# Patient Record
Sex: MALE | Race: WHITE | NOT HISPANIC OR LATINO
[De-identification: names, ages, dates, MRNs, and addresses within clinical notes are randomized per-mention and may not be internally consistent; named-entity substitution may affect disease eponyms.]

---

## 2021-12-02 ENCOUNTER — APPOINTMENT (OUTPATIENT)
Dept: UROLOGY | Facility: CLINIC | Age: 26
End: 2021-12-02
Payer: MEDICAID

## 2021-12-02 VITALS
HEART RATE: 84 BPM | OXYGEN SATURATION: 96 % | DIASTOLIC BLOOD PRESSURE: 75 MMHG | SYSTOLIC BLOOD PRESSURE: 129 MMHG | TEMPERATURE: 98.4 F | BODY MASS INDEX: 17.67 KG/M2 | WEIGHT: 90 LBS | HEIGHT: 60 IN

## 2021-12-02 DIAGNOSIS — Z80.9 FAMILY HISTORY OF MALIGNANT NEOPLASM, UNSPECIFIED: ICD-10-CM

## 2021-12-02 DIAGNOSIS — Z87.728 PERSONAL HISTORY OF OTHER SPECIFIED (CORRECTED) CONGENITAL MALFORMATIONS OF NERVOUS SYSTEM AND SENSE ORGANS: ICD-10-CM

## 2021-12-02 DIAGNOSIS — Z78.9 OTHER SPECIFIED HEALTH STATUS: ICD-10-CM

## 2021-12-02 PROCEDURE — 99204 OFFICE O/P NEW MOD 45 MIN: CPT

## 2021-12-02 RX ORDER — METHENAMINE HIPPURATE 1 G/1
1 TABLET ORAL TWICE DAILY
Qty: 60 | Refills: 6 | Status: ACTIVE | COMMUNITY
Start: 2021-12-02 | End: 1900-01-01

## 2021-12-02 RX ORDER — CRANBERRY FRUIT EXTRACT 200 MG
CAPSULE ORAL
Refills: 0 | Status: ACTIVE | COMMUNITY

## 2021-12-02 RX ORDER — SOLIFENACIN SUCCINATE 10 MG/1
TABLET ORAL
Refills: 0 | Status: ACTIVE | COMMUNITY

## 2021-12-02 NOTE — LETTER BODY
[Dear  ___] : Dear  [unfilled], [Consult Letter:] : I had the pleasure of evaluating your patient, [unfilled]. [Please see my note below.] : Please see my note below. [Consult Closing:] : Thank you very much for allowing me to participate in the care of this patient.  If you have any questions, please do not hesitate to contact me. [Sincerely,] : Sincerely, [FreeTextEntry3] : Sam Aranda MD\par System Director [facility]\par Department of Urology\par [facility] \par   at [facility]\par  of Urology\par [facility] [employer] at hospitals/[facility]\par

## 2021-12-02 NOTE — ASSESSMENT
[FreeTextEntry1] : \par Impression/plan: 26 year-old gentleman with spina bifida presents to the office with recurrent UTIs, neurogenic bladder and OAB-wet.\par \par 1. Renal US: assess for hydronephrosis. \par 2. Start Hiprex BID for 6 month. Start with Hiprex once finished with Bactrim DS. \par 3. Will f/u in 2 weeks via TTM.\par 4. Requested chart work from Dr. Gan. \par \par

## 2021-12-02 NOTE — HISTORY OF PRESENT ILLNESS
[FreeTextEntry1] : 26 year-old gentleman with spina bifida presents to the office with c/o of recurrent UTIs. Patient reports over 10 UTIs in this past calender year. Patient reports that since this past September he has been on 3-4 different medications. The past three UTIs he has been on Bactrim. Patient is currently on Bactrim DS. Patient reports his symptoms to be more oh a difficulty urinating and b/l flank pain. Patient reports that in the past patient would get  UTIs 4x a year. Patient does CIC every 4 hours and wears diapers throughout the day which he changes every time he does CIC. Patient has seen Dr. Gan ([place]) UDS in April 2021 and cystoscopy. Patient and mother reports that no stones were ever found. Last US  was in 2020. Denies fevers, chills, n/v/d, chest pain, SOB at this times\par \par Patient on Solifenacin 20 mg daily prescribed by Dr. Gan, which he has not taken since he has had this recurrent UTIs. Patient and mother report that Dr. Gan mentioned intravesical Botox but never specified when it would be done. \par \par Will call and get study reports and imaging results faxed over.

## 2021-12-02 NOTE — PHYSICAL EXAM
[General Appearance - Well Developed] : well developed [General Appearance - Well Nourished] : well nourished [Normal Appearance] : normal appearance [Well Groomed] : well groomed [General Appearance - In No Acute Distress] : no acute distress [Edema] : no peripheral edema [Respiration, Rhythm And Depth] : normal respiratory rhythm and effort [Exaggerated Use Of Accessory Muscles For Inspiration] : no accessory muscle use [] : no rash [Oriented To Time, Place, And Person] : oriented to person, place, and time [Affect] : the affect was normal [Mood] : the mood was normal [Not Anxious] : not anxious [FreeTextEntry1] : patient with spina bifida

## 2021-12-17 ENCOUNTER — APPOINTMENT (OUTPATIENT)
Dept: UROLOGY | Facility: CLINIC | Age: 26
End: 2021-12-17
Payer: MEDICAID

## 2021-12-17 DIAGNOSIS — N39.41 URGE INCONTINENCE: ICD-10-CM

## 2021-12-17 DIAGNOSIS — N39.0 URINARY TRACT INFECTION, SITE NOT SPECIFIED: ICD-10-CM

## 2021-12-17 DIAGNOSIS — N31.9 NEUROMUSCULAR DYSFUNCTION OF BLADDER, UNSPECIFIED: ICD-10-CM

## 2021-12-17 PROCEDURE — ZZZZZ: CPT

## 2021-12-17 RX ORDER — NITROFURANTOIN MACROCRYSTALS 50 MG/1
50 CAPSULE ORAL
Qty: 30 | Refills: 6 | Status: ACTIVE | COMMUNITY
Start: 2021-12-17 | End: 1900-01-01

## 2021-12-20 NOTE — ASSESSMENT
[FreeTextEntry1] : \par \par Impression/plan: 26 year-old gentleman with spina bifida with recurrent UTIs, neurogenic bladder and OAB-wet.\par \par 1. We discussed option of Botox injections, he will think about it while continuing on anticholinergic. \par 2. Change UTI prophylaxis to daily SHELIA, se discussed. \par 3. F/u in 3 months. \par

## 2021-12-20 NOTE — LETTER BODY
[Dear  ___] : Dear  [unfilled], [Courtesy Letter:] : I had the pleasure of seeing your patient, [unfilled], in my office today. [Please see my note below.] : Please see my note below. [Consult Closing:] : Thank you very much for allowing me to participate in the care of this patient.  If you have any questions, please do not hesitate to contact me. [Sincerely,] : Sincerely, [FreeTextEntry3] : Sam Aranda MD\par System Director [facility]\par Department of Urology\par [facility] \par   at [facility]\par  of Urology\par [facility] [employer] at [facility]\par

## 2021-12-20 NOTE — HISTORY OF PRESENT ILLNESS
[Other Location: e.g. School (Enter Location, City,State)___] : at [unfilled], at the time of the visit. [FreeTextEntry1] : 26 year-old gentleman with spina bifida presents to the office with c/o of recurrent UTIs. Patient reports over 10 UTIs in this past calender year. Patient reports that since this past September he has been on 3-4 different medications. The past three UTIs he has been on Bactrim. Patient is currently on Bactrim DS. Patient reports his symptoms to be more oh a difficulty urinating and b/l flank pain. Patient reports that in the past patient would get UTIs 4x a year. Patient does CIC every 4 hours and wears diapers throughout the day which he changes every time he does CIC. Patient has seen Dr. Gan ([place]) UDS in April 2021 and cystoscopy. Patient and mother reports that no stones were ever found. Last US was in 2020. Denies fevers, chills, n/v/d, chest pain, SOB at this times\par \par Patient on Solifenacin 20 mg daily prescribed by Dr. Gan, which he has not taken since he has had this recurrent UTIs. Patient and mother report that Dr. Gan mentioned intravesical Botox but never specified when it would be done. \par \par Renal US - no hydro. \par \par Video-UDS - performed with Dr. Gan showed reduced compliance, snf 420 ml with left grade IV VUR. Patient unable to void for the study. \par \par Since his last visit, developed a UTI while on Hiprex for only a week.

## 2021-12-28 ENCOUNTER — NON-APPOINTMENT (OUTPATIENT)
Age: 26
End: 2021-12-28

## 2022-01-11 RX ORDER — SOLIFENACIN SUCCINATE 10 MG/1
10 TABLET ORAL DAILY
Qty: 30 | Refills: 6 | Status: ACTIVE | COMMUNITY
Start: 2022-01-11 | End: 1900-01-01